# Patient Record
Sex: MALE | Race: WHITE
[De-identification: names, ages, dates, MRNs, and addresses within clinical notes are randomized per-mention and may not be internally consistent; named-entity substitution may affect disease eponyms.]

---

## 2017-10-04 ENCOUNTER — HOSPITAL ENCOUNTER (EMERGENCY)
Dept: HOSPITAL 76 - ED | Age: 32
Discharge: HOME | End: 2017-10-04
Payer: COMMERCIAL

## 2017-10-04 VITALS — DIASTOLIC BLOOD PRESSURE: 71 MMHG | SYSTOLIC BLOOD PRESSURE: 119 MMHG
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
## 2017-10-04 DIAGNOSIS — S61.210A: Primary | ICD-10-CM
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
## 2017-10-04 DIAGNOSIS — F17.200: ICD-10-CM

## 2017-10-04 PROCEDURE — 99282 EMERGENCY DEPT VISIT SF MDM: CPT

## 2017-10-04 PROCEDURE — 99283 EMERGENCY DEPT VISIT LOW MDM: CPT

## 2017-10-04 NOTE — ED PHYSICIAN DOCUMENTATION
PD HPI UPPER EXT INJURY





- Stated complaint


Stated Complaint: SYNCOPAL/RT FINGER LAC





- Chief complaint


Chief Complaint: Laceration





- History obtained from


History obtained from: Patient





- History of Present Illness


Location: Right, Finger (index finger tip)


Where injury occurred: Work


Timing - onset: Today (he was working and caught fingertip with edge of metal, 

with some bleeding but wa wearing glove. He went inside to clean off the wound, 

saw the blood, got lightheaded and fainted. Out briefly and recovered without 

symptoms.)


Timing - details: Abrupt onset


Similar symptoms before: Diagnosis (vasovagal syncope with blood couple of 

other times.)


Recently seen: Not recently seen





Review of Systems


Constitutional: denies: Fever, Chills


Cardiac: denies: Chest pain / pressure, Palpitations


Respiratory: denies: Dyspnea


GI: denies: Vomiting, Diarrhea


Neurologic: denies: Headache, Head injury





PD PAST MEDICAL HISTORY





- Past Medical History


Past Medical History: No





- Past Surgical History


Past Surgical History: Yes


Ortho: Arthroscopic surgery


HEENT: Tonsil/Adenoidectomy





- Present Medications


Home Medications: 


 Ambulatory Orders











 Medication  Instructions  Recorded  Confirmed


 


No Known Home Medications [No  10/04/17 10/04/17





Known Home Medications]   














- Allergies


Allergies/Adverse Reactions: 


 Allergies











Allergy/AdvReac Type Severity Reaction Status Date / Time


 


No Known Drug Allergies Allergy   Verified 10/04/17 17:14














- Social History


Does the pt smoke?: Yes


Smoking Status: Current every day smoker


Does the pt drink ETOH?: Yes


Does the pt have substance abuse?: No





- Immunizations


Immunizations are current?: Yes





PD ED PE NORMAL





- Vitals


Vital signs reviewed: Yes





- General


General: Alert and oriented X 3, No acute distress, Well developed/nourished





- HEENT


HEENT: Atraumatic





- Neck


Neck: Supple, no meningeal sign, No adenopathy





- Cardiac


Cardiac: RRR, No murmur





- Derm


Derm: Normal color, Warm and dry





- Extremities


Extremities: Other (right index finger tip with 1/2 cm diameter partial 

thickness avulsion of skin. Not in nailbed. )





- Neuro


Neuro: Alert and oriented X 3, CNs 2-12 intact, No motor deficit, No sensory 

deficit, Normal speech, Other





Results





- Vitals


Vitals: 


 Oxygen











O2 Source                      Room air

















PD MEDICAL DECISION MAKING





- ED course


Complexity details: considered differential (small partial thickness avulsion 

not involving nail. Does not need sutures/etc. SOunds like common vasovagal 

syncope otherwise. ), d/w patient





Departure





- Departure


Disposition: 01 Home, Self Care


Clinical Impression: 


Avulsion of finger


Qualifiers:


 Encounter type: initial encounter Qualified Code(s): S61.209A - Unspecified 

open wound of unspecified finger without damage to nail, initial encounter





Condition: Stable


Record reviewed to determine appropriate education?: Yes


Instructions:  ED Avulsion Dermal


Follow-Up: 


[facility] [place] [Provider Group]


Comments: 


Clean the wound twice a day and apply ointment and Band-Aid.  Should heal over 

a week or so.  Recheck if signs of infection.  Tylenol or ibuprofen if needed 

for pain.


Discharge Date/Time: 10/04/17 18:00

## 2018-10-14 ENCOUNTER — HOSPITAL ENCOUNTER (EMERGENCY)
Dept: HOSPITAL 76 - ED | Age: 33
Discharge: HOME | End: 2018-10-14
Payer: COMMERCIAL

## 2018-10-14 VITALS — SYSTOLIC BLOOD PRESSURE: 130 MMHG | DIASTOLIC BLOOD PRESSURE: 79 MMHG

## 2018-10-14 DIAGNOSIS — T31.0: ICD-10-CM

## 2018-10-14 DIAGNOSIS — Z87.891: ICD-10-CM
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
## 2018-10-14 DIAGNOSIS — T23.022A: ICD-10-CM

## 2018-10-14 DIAGNOSIS — T75.4XXA: Primary | ICD-10-CM
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
## 2018-10-14 PROCEDURE — 99282 EMERGENCY DEPT VISIT SF MDM: CPT
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
## 2019-10-21 ENCOUNTER — HOSPITAL ENCOUNTER (EMERGENCY)
Dept: HOSPITAL 76 - ED | Age: 34
Discharge: HOME | End: 2019-10-21
Payer: COMMERCIAL

## 2019-10-21 VITALS — DIASTOLIC BLOOD PRESSURE: 72 MMHG | SYSTOLIC BLOOD PRESSURE: 125 MMHG
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
## 2019-10-21 DIAGNOSIS — S39.012A: Primary | ICD-10-CM
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
## 2019-10-21 PROCEDURE — 99284 EMERGENCY DEPT VISIT MOD MDM: CPT

## 2019-10-21 NOTE — ED PHYSICIAN DOCUMENTATION
PD HPI BACK INJURY





- Stated complaint


Stated Complaint: BACK PX





- History obtained from


History obtained from: Patient





- History of Present Illness


Location: Left, Lower


Type of injury: Twist (he was bending and lifting heavy object to move it to the

right and felt onset of left lower back pain, which has continued to hurt and 

feel tight. NO leg pain, numbness, weakness.)


Where injury occurred: Work


Timing - onset: Yesterday


Timing - details: Abrupt onset, Still present


Worsened by: Moving, Palpating


Associated symptoms: No: Weakness, Numbness, Incontinent of urine


Similar symptoms before: No diagnosis (has had episodic low back pain/strain; no

ongoing back problems.)





Review of Systems


Constitutional: denies: Fever, Chills


Nose: denies: Rhinorrhea / runny nose, Congestion


Throat: denies: Sore throat


Respiratory: denies: Cough


: denies: Incontinent


Neurologic: denies: Focal weakness, Numbness





PD PAST MEDICAL HISTORY





- Past Medical History


Respiratory: None


Neuro: None


Musculoskeletal: None





- Past Surgical History


Past Surgical History: Yes


Ortho: Shoulder arthroplasty, Arthroscopic surgery


HEENT: Tonsil/Adenoidectomy





- Present Medications


Home Medications: 


                                Ambulatory Orders











 Medication  Instructions  Recorded  Confirmed


 


Hydrocodone/Acetaminophen 1 each PO Q6H PRN #16 tablet 10/21/19 





[Hydrocodon-Acetaminophen 5-325]   


 


Naproxen 500 mg PO BID #20 tablet 10/21/19 


 


Omeprazole Magnesium [Prilosec]  10/21/19 


 


Tizanidine HCl 4 mg PO TID PRN #25 capsule 10/21/19 


 


dexAMETHasone [Decadron] 4 mg PO DAILY #5 tablet 10/21/19 














- Allergies


Allergies/Adverse Reactions: 


                                    Allergies











Allergy/AdvReac Type Severity Reaction Status Date / Time


 


No Known Drug Allergies Allergy   Verified 10/14/18 18:18














- Social History


Does the pt smoke?: No


Smoking Status: Never smoker


Does the pt drink ETOH?: Yes


Does the pt have substance abuse?: No





- Immunizations


Immunizations are current?: Yes





PD ED PE NORMAL





- Vitals


Vital signs reviewed: Yes





- General


General: Alert and oriented X 3, Well developed/nourished, Other (appears 

uncomfortable with guarded ROM of the low back. )





- Abdomen


Abdomen: Soft, Non tender





- Derm


Derm: Normal color, Warm and dry





- Extremities


Extremities: Normal ROM s pain





- Neuro


Neuro: Alert and oriented X 3, No motor deficit, No sensory deficit, Normal 

speech, Other (normal knee reflexes)





Results





- Vitals


Vitals: 


                               Vital Signs - 24 hr











  10/21/19 10/21/19





  12:22 14:27


 


Temperature 36.8 C 


 


Heart Rate 61 59 L


 


Respiratory 18 18





Rate  


 


Blood Pressure 115/63 125/72


 


O2 Saturation 99 100








                                     Oxygen











O2 Source                      Room air

















PD MEDICAL DECISION MAKING





- ED course


Complexity details: considered differential (episodic low back pain with injury,

 no red flags and hurting in left lumbar muscle at iliac crest. ), d/w patient





Departure





- Departure


Disposition: 01 Home, Self Care


Clinical Impression: 


Low back strain


Qualifiers:


 Encounter type: initial encounter Qualified Code(s): S39.012A - Strain of 

muscle, fascia and tendon of lower back, initial encounter





Condition: Stable


Record reviewed to determine appropriate education?: Yes


Instructions:  ED Sprain Strain Lumbar


Follow-Up: 


[facility] [Provider Group]


Prescriptions: 


dexAMETHasone [Decadron] 4 mg PO DAILY #5 tablet


Hydrocodone/Acetaminophen [Hydrocodon-Acetaminophen 5-325] 1 each PO Q6H PRN #16

tablet


 PRN Reason: pain


Naproxen 500 mg PO BID #20 tablet


Tizanidine HCl 4 mg PO TID PRN #25 capsule


 PRN Reason: Spasms


Comments: 


Heat and stretching for the low back.  Anti-inflammatories such as naproxen 

twice daily for the next 7 to 10 days.  Decadron steroid anti-inflammatory as 

well daily for 5 days.  Add tizanidine muscle relaxant for stiffness and spasm. 

Add Tylenol or hydrocodone as needed for pain.  Off work for today tomorrow and 

then several days of light lifting and bending.  Follow-up with your primary 

care in a few days.


Forms:  Activity restrictions


Discharge Date/Time: 10/21/19 14:27